# Patient Record
Sex: MALE | URBAN - METROPOLITAN AREA
[De-identification: names, ages, dates, MRNs, and addresses within clinical notes are randomized per-mention and may not be internally consistent; named-entity substitution may affect disease eponyms.]

---

## 2022-05-08 ENCOUNTER — HOME HEALTH ADMISSION (OUTPATIENT)
Dept: HOME HEALTH SERVICES | Facility: HOME HEALTH | Age: 77
End: 2022-05-08
Payer: MEDICARE

## 2022-05-09 ENCOUNTER — HOME CARE VISIT (OUTPATIENT)
Dept: SCHEDULING | Facility: HOME HEALTH | Age: 77
End: 2022-05-09
Payer: MEDICARE

## 2022-05-09 ENCOUNTER — HOME CARE VISIT (OUTPATIENT)
Dept: HOME HEALTH SERVICES | Facility: HOME HEALTH | Age: 77
End: 2022-05-09
Payer: MEDICARE

## 2022-05-09 VITALS
SYSTOLIC BLOOD PRESSURE: 128 MMHG | OXYGEN SATURATION: 92 % | TEMPERATURE: 97.5 F | HEART RATE: 83 BPM | DIASTOLIC BLOOD PRESSURE: 71 MMHG | RESPIRATION RATE: 18 BRPM

## 2022-05-09 VITALS
DIASTOLIC BLOOD PRESSURE: 71 MMHG | TEMPERATURE: 97.5 F | SYSTOLIC BLOOD PRESSURE: 128 MMHG | HEART RATE: 83 BPM | RESPIRATION RATE: 18 BRPM | OXYGEN SATURATION: 92 %

## 2022-05-09 PROCEDURE — G0151 HHCP-SERV OF PT,EA 15 MIN: HCPCS

## 2022-05-09 PROCEDURE — 400013 HH SOC

## 2022-05-09 PROCEDURE — MED12750

## 2022-05-09 PROCEDURE — A4245 ALCOHOL WIPES PER BOX: HCPCS

## 2022-05-09 PROCEDURE — G0299 HHS/HOSPICE OF RN EA 15 MIN: HCPCS

## 2022-05-09 PROCEDURE — A4216 STERILE WATER/SALINE, 10 ML: HCPCS

## 2022-05-09 NOTE — Clinical Note
1800 grooming 2  1810 upper body dressing 2  1820 Lower body dressing  2  1830 Bathing 5  1840 Toilet Transfer 1   1845 toilet hygiene 2  1850 Transferring 2  1860 Ambulation 3  1870 feeding/eating 1  1880 meals 2  1400 2   5

## 2022-05-09 NOTE — HOME HEALTH
S: patient reports that he wants to be able to walk outside - and cut the grass on his riding lawnmower   he also has the main goal of walking without the walker to get back to his prior level of function  O: PAIN: patient has a current flare up of gout in the right foot -pain rated 5/10 - highest is 9/10 and lowest is 5/10  patient is taking pain medication on schedule, educated patient on use of ice for pain and edema  patient to apply ice to the right foot  for pain and swelling control per MD protocol using a barrier to protect the skin. Patient to monitor the skin for any adverse effects such as color changes, irritation, mottled appearance. Patient to use ice for 20 minutes an hour for the first 2-3 days and then can reduce to 20 minutes 4-5 times a day. WOUND: patient has a drain from his chelsysectomy - nursing to monitor the wound site   ROM: B LE hip flexion, abduction and adduction WFL  B knee flexion, extension WFL   STRENGTH: R knee ext 5/5, R knee flexion 5/5, R hip flex 4/5, R hip abd/adduction 4+/5  L knee flexion and extension 5/5, L hip flexion 4/5 abduction and adduction 4+/5  BED MOBILITY: patient is independent with bed mobility . EQUIPMENT: FWW  TRANSFERS: patient was on the couch in arrival to the home - he has the R LE propped up due to pain and swelling from the gout in the right foot. He completed sit to stand from the couch with B UE push up and weight shift toward the left side due to R foot pain. He used the walker for initial standing balance and shifted the weight toward the left side in standing. He demonstrated ability to get into the bathroom and on/off the toilet with SBA  GAIT: ambulating with FWW in the home with antalgic gait pattern with reduced stance time on the right LE due to pain in the right foot.  He demonstrated continuous stepping pattern with asymmetrical step length (right greater than left) with cues for keeping the walker close to him as he tends to have a flexed trunk with the walker too far in front of him. He ambulated in the home for 25 feet x 2 with SBA and cues for gait correction  STEPS: patient has 3 steps to enter and egress the house via the front door - patient reports he does not have shoes that will fit on the right foot so we were unable to attempt the steps or uneven surfaces  BALANCE: radha 17/28 high fall risk   RESPONSE TO TREATMENT:patient demonstrated a positive outcome post treatment and reported a reduction in pain, increased comfort and increased confidence with transfers and mobility. Patient reported good understanding of the HEP and reports confidence in ability to complete the program as prescribed by PT  RESPONSE TO EDUCATION: patient was educated on: safety, HEP, plans for therapy - patient expressed understanding   Patient expressed understanding of all education provided and was able to repeat back education, precautions, and HEP. A:ASSESSMENT AND PROGRESS TOWARD GOALS:  Patient demonstrated a positive result to therapy this date as evidenced by an improvement in gait pattern with cues, decreased pain with exercise and walking and patient expressing an understanding of the rehab plan due to therapy verbal and written instructions. Goals established for increased independence in the home, safe mobility in the home, improvement in strength - all designed to reduce fall risk and progress toward independence. Patient will benefit from continued PT intervention to progress toward meeting all established goals    P:.continue with progression of mobility, ther ex for strength, ROM, provide education to patient and caregivers to maximize the recovery and reduce the fall risk to progress toward a return to independent function  plan for 3w1, 2w1    PMH/Summary of clinical condition: Acalculous Cholecystitis   List of Comorbidities:    GOUT    HTN    UTI     Medications review completed.     no changes reported  medications are effective at this time    social history/ caregivers:patient lives with his wife in a one story house with 3 steps to enter and egress via the front door. His wife is the main caregiver and assists as needed with meals, medications, ADLs, mobility andntransportation     Pt/Caregiver instructed on plan of care and are agreeable to plan of care at this time. Plan of care and admission to home health status called to attending Evi Ramirez MD     Discharge planning discussed with patient and caregiver. Discharge planning as follows: DC to self and family under MD supervision when all goals are met or maximum potential is reached  Pt/Caregiver did verbalize understanding. Patient education provided this visit: safety, pain control, mobility, plan for therapy     Home exercise program: stand/walk hourly with or without the walker with his wife   work on equal weight bearing during sit to stand and stand to sit    Continued need for the following skills: MD referral for HHPT following recent hospital stay. HHPT is medically necessary to address  dx and clinical findings: decreased ROM, decreased strength, impaired gait, decreased ability w stair negotiation, decreased transfer status, decreased endurance, decreased balance and decreased safety, increased pain in order to improve functional mobility/quality of life, decrease burden of care, reduce risk for re-hospitalization, work towards patient's personal goals of return to PLOF w decrease risk for falls.

## 2022-05-10 ENCOUNTER — HOME CARE VISIT (OUTPATIENT)
Dept: HOME HEALTH SERVICES | Facility: HOME HEALTH | Age: 77
End: 2022-05-10
Payer: MEDICARE

## 2022-05-11 ENCOUNTER — HOME CARE VISIT (OUTPATIENT)
Dept: SCHEDULING | Facility: HOME HEALTH | Age: 77
End: 2022-05-11
Payer: MEDICARE

## 2022-05-11 ENCOUNTER — HOME CARE VISIT (OUTPATIENT)
Dept: HOME HEALTH SERVICES | Facility: HOME HEALTH | Age: 77
End: 2022-05-11
Payer: MEDICARE

## 2022-05-11 VITALS
SYSTOLIC BLOOD PRESSURE: 150 MMHG | TEMPERATURE: 98 F | OXYGEN SATURATION: 96 % | RESPIRATION RATE: 20 BRPM | DIASTOLIC BLOOD PRESSURE: 85 MMHG | HEART RATE: 86 BPM

## 2022-05-11 PROCEDURE — G0157 HHC PT ASSISTANT EA 15: HCPCS

## 2022-05-11 NOTE — HOME HEALTH
Lian Blackman SUBJECTIVE: Patient reported that he no longer has gout pain. Pt did not want to walk outside this morning. CAREGIVER INVOLVEMENT/ASSISTANCE NEEDED FOR: Patient's wife assists with transportation, ADLS, and IADLS as needed. HOME HEALTH SUPPLIES BY TYPE AND QUANTITY ORDERED/DELIVERED THIS VISIT INCLUDE: None needed for this visit. OBJECTIVE:  See interventions. PATIENT RESPONSE TO TREATMENT: Patient had no c/o pain after walking and exervcising. PATIENT LEVEL OF UNDERSTANDING OF EDUCATION PROVIDED: Patient verbalized understanding on pain management techniques and fall prevention. ASSESSMENT OF PROGRESS TOWARD GOALS: Patient is progressing toward goals for gait and transfers. Patient previously needed SBA to ambulate safely but progressed to Supervision for gait training today. Patient ambulated with decreased chuck, decreased foot clearance, and forward flexed trunk. Gave multiple vc's for pt to lift B foot higher to clear floor safely and to maintain erect posture. Pt needs reinforcement for safety with gait and to improve posture. Pt also previously needed SBA to perform sit < > stand transfers but progressed to Supervision for transfer training today. Gave vc's for pt to lean forward and to push off using B UE to stand up. Pt needs reinforcement to improve transfer technique. In addition, instructed patient on performing therapeutic exercises for B LE/B hip strengthening and instructed pt to comply with HEP. CONTINUED NEED FOR THE FOLLOWING SKILLS: Gait Training, Stairs Training, Transfers Training, and Therapeutic Exercises. PLAN FOR NEXT VISIT: Patient will be seen 1w1 2w1 to increase safety with gait, to improve transfer technique, and to increase strength of B LE. DISCHARGE PLANNING DISCUSSED WITH PATIENT/CAREGIVER: Discharge patient to family with MD supervision when all goals are met. Pt/Caregiver did verbalize understanding of discharge planning.

## 2022-05-12 ENCOUNTER — HOME CARE VISIT (OUTPATIENT)
Dept: SCHEDULING | Facility: HOME HEALTH | Age: 77
End: 2022-05-12
Payer: MEDICARE

## 2022-05-12 VITALS
SYSTOLIC BLOOD PRESSURE: 146 MMHG | HEART RATE: 87 BPM | DIASTOLIC BLOOD PRESSURE: 88 MMHG | TEMPERATURE: 98.2 F | OXYGEN SATURATION: 96 %

## 2022-05-12 VITALS
OXYGEN SATURATION: 96 % | RESPIRATION RATE: 18 BRPM | SYSTOLIC BLOOD PRESSURE: 128 MMHG | DIASTOLIC BLOOD PRESSURE: 65 MMHG | TEMPERATURE: 97.7 F | HEART RATE: 76 BPM

## 2022-05-12 VITALS
OXYGEN SATURATION: 93 % | SYSTOLIC BLOOD PRESSURE: 147 MMHG | HEART RATE: 78 BPM | TEMPERATURE: 98.4 F | DIASTOLIC BLOOD PRESSURE: 68 MMHG | RESPIRATION RATE: 16 BRPM

## 2022-05-12 PROCEDURE — G0157 HHC PT ASSISTANT EA 15: HCPCS

## 2022-05-12 PROCEDURE — G0152 HHCP-SERV OF OT,EA 15 MIN: HCPCS

## 2022-05-12 PROCEDURE — G0299 HHS/HOSPICE OF RN EA 15 MIN: HCPCS

## 2022-05-12 NOTE — HOME HEALTH
SUBJECTIVE: Patient c/o elevated pain #6/10 from his right foot this afternoon due to gout. He did not want to do anything today. CAREGIVER INVOLVEMENT/ASSISTANCE NEEDED FOR: Patient's wife assists with transportation, ADLS, and IADLS as needed. HOME HEALTH SUPPLIES BY TYPE AND QUANTITY ORDERED/DELIVERED THIS VISIT INCLUDE: None needed for this visit. OBJECTIVE:  See interventions. PATIENT RESPONSE TO TREATMENT: Defer to next encounter. PATIENT LEVEL OF UNDERSTANDING OF EDUCATION PROVIDED: Patient verbalized understanding on pain management techniques and fall prevention. ASSESSMENT OF PROGRESS TOWARD GOALS: Patient was unable to progress toward goals for physical therapy. Pt previously ambulated and performed transfers training yesterday but refused to getting up to walk or perform therapeutic exercises due to c/o increased R foot pain due to gout. Focused today's visit on patient education regarding pain management, fall prevention, and therapeutic exercises. Plan to walk outside next visit. Discussed with patient/CG plan to discharge pt from [facility] PT services next week. Pt/CG verbalized understanding and is in agreement with discharge plan. CONTINUED NEED FOR THE FOLLOWING SKILLS: Gait Training, Stairs Training, Transfers Training, and Therapeutic Exercises. PLAN FOR NEXT VISIT: Patient will be seen 2w1 to increase s afety with gait, to improve transfer technique, and to increase strength of B LE. DISCHARGE PLANNING DISCUSSED WITH PATIENT/CAREGIVER: Discharge patient to family with MD supervision when all goals are met. Pt/Caregiver did verbalize understanding of discharge planning.

## 2022-05-12 NOTE — Clinical Note
Therapy Functional Score Assessment  Question   Score   Grooming  1       Upper Dressing 1      Lower Dressing 1      Bathing  5      Toilet Transfer  1    Transfer  1            Ambulation  3   Dyspnea                     1       Pain Interfering with activity 4  Est number therapy visits      1  Pt declined further OT visits

## 2022-05-12 NOTE — HOME HEALTH
Caregiver involvement: Dandre Meadows (spouse) lives with pt and provides daily emotional support and S for safety with ADL and mobility. Medications reviewed and all medications are available in the home this visit. The following education was provided regarding medications, medication interactions, and look alike medications (specify): Continue as directed by MD.    Medications  are effective at this time. Patient education provided this visit: see ADL note. Sharps education provided:  na    Patient level of understanding of education provided: see ADL note    Skilled Care Performed this visit: Completed OT evaluation and assessment for safety with ADL and mobility. Patient response to procedure performed:  Mr. Dyan Reddy had a good response to therapy. He deneis having increased pain during the assessment and was agreeable to particiapte and demonstrate ability complete ADL tasks. Patient's Progress towards personal goals: Mr. Dyan Reddy has good potential to return to independence with ADL and mobility through continued S from family for safety with ADL and mobility. Home exercise program: na    Continued need for the following skills: Nursing and Physical Therapy    Discharge Plans:  1w1 with plans to discharge to self per pt request.  Will notify MD of OT discharge.     PMH/Summary of clinical condition: Acalculous Cholecystitis (NECROINFLAMMATORY DISEASE OF THE GALLBLADDER)  List of Comorbidities:   GOUT   HTN   UTI

## 2022-05-16 ENCOUNTER — HOME CARE VISIT (OUTPATIENT)
Dept: HOME HEALTH SERVICES | Facility: HOME HEALTH | Age: 77
End: 2022-05-16
Payer: MEDICARE

## 2022-05-16 ENCOUNTER — HOME CARE VISIT (OUTPATIENT)
Dept: SCHEDULING | Facility: HOME HEALTH | Age: 77
End: 2022-05-16
Payer: MEDICARE

## 2022-05-16 VITALS
HEART RATE: 74 BPM | SYSTOLIC BLOOD PRESSURE: 138 MMHG | OXYGEN SATURATION: 95 % | TEMPERATURE: 98.2 F | RESPIRATION RATE: 15 BRPM | DIASTOLIC BLOOD PRESSURE: 88 MMHG

## 2022-05-16 VITALS
RESPIRATION RATE: 16 BRPM | DIASTOLIC BLOOD PRESSURE: 83 MMHG | SYSTOLIC BLOOD PRESSURE: 134 MMHG | HEART RATE: 71 BPM | TEMPERATURE: 97.9 F | OXYGEN SATURATION: 97 %

## 2022-05-16 PROCEDURE — G0157 HHC PT ASSISTANT EA 15: HCPCS

## 2022-05-16 PROCEDURE — G0299 HHS/HOSPICE OF RN EA 15 MIN: HCPCS

## 2022-05-16 NOTE — HOME HEALTH
SUBJECTIVE: Patient had no c/o pain from his R foot this afternoon. CAREGIVER INVOLVEMENT/ASSISTANCE NEEDED FOR: Patient's wife assists with transportation, ADLS, and IADLS as needed. HOME HEALTH SUPPLIES BY TYPE AND QUANTITY ORDERED/DELIVERED THIS VISIT INCLUDE: None needed for this visit. OBJECTIVE:  See interventions. PATIENT RESPONSE TO TREATMENT: Patient was short of breath but recovered after sitting for 1 minute. Patient did not report any pain this visit. PATIENT LEVEL OF UNDERSTANDING OF EDUCATION PROVIDED: Patient repeated back teaching on pain management techniques and fall prevention. ASSESSMENT OF PROGRESS TOWARD GOALS: Patient progressed toward goals for gait and stairs this visit. Pt continues to need Supervision to ambulate safely but progressed to ambulating longer distance outside over uneven surfaces. Patient ambulated with good step length and improved foot clearance but with forward flexed posture. Gave multiple vc's for pt to maintain erect posture. Pt needs constant vc's to improve posture. Pt also previously did not go up and down stairs due to gout pain from R foot but progressed to SBA for stairs training today. Pt led with R LE to go up stairs and led with his L LE to go down stairs. Pt needed assistance to bring FWW up and down stairs. In addition, Patient demonstrated improved balance as evidence by improving his Tinetti balance assessment score from 17/28 from PT initial evaluation to 20/28 today. Discussed with patient/CG plan to discharge pt from [facility] PT services this week. Pt/CG verbalized understanding and is in agreement with discharge plan. CONTINUED NEED FOR THE FOLLOWING SKILLS: None. PLAN FOR NEXT VISIT: Patient will be seen 1w1 to reassess safety with gait, to improve transfer technique, and to increase strength of B LE. DISCHARGE PLANNING DISCUSSED WITH PATIENT/CAREGIVER: Discharge patient to family with MD supervision when all goals are met.  Pt/Caregiver did verbalize understanding of discharge planning.

## 2022-05-16 NOTE — HOME HEALTH
Skilled reason for visit: ACALCULOUS CHOLECYSTITIS, HTN, PRESSURE ULCER DRAIN CARE, AND MEDICATION TEACHING. Caregiver involvement: SPOUSE PERFORM DRAIN FLUSHING/ADLS/TRANSPORTATION    Medications reviewed and all medications are available in the home this visit. The following education was provided regarding medications:  LASIX, NORVASC, allopurinol. REVIEWED REASON, FREQUENCY, AND SIDE EFFECTS. LASIX:Reviewed daily weight with similar clothing after urination in the AM. Record results daily reporting any weight gain of 3-5lbs in a day to MD.     MD notified of any discrepancies/look a-like medications/medication interactions:NORCO NOT TAKING BUT REVIEWED: oxycodone reviewed to take with food, constipation, fall and sedation risk. Medications are effective at this time. Home health supplies by type and quantity ordered/delivered this visit include: drain flushes. Patient education provided this visit: see interventions tab. Sharps education provided: N/A    Patient level of understanding of education provided: see interventions tab. Skilled Care Performed this visit: Called Dr Shmuel David office to reported erythema around the drain insertion site. no pain, fever, chills, warmth or drainage noted. Spoke to Jayant who transferred to Dr Shmuel David nurse. LM on nursing VM. waiting on return call. instructed that if s/sx of infection: fever, chills, drainage, pain or erythema spreads to seek medical treatment. Patient response to procedure performed: tolerated drain assessment/care without any complaints of pain.     Agency Progress toward goals: see intervention tab to progression toward goals         Patient's Progress towards personal goals: see intervention tab to progression toward goals         Home exercise program: CONT ALL MEDICATIONS AS PRESCRIBED, DIET AS PRESCRIBED, IMPLEMENT FALL/INFECTION CONTROL/PRESSURE ULCER INTERVENTIONS,MONITOR FOR ABNORMAL S/SX infection/HTN (listed in intervention tab) Marquise Ponce TO MD IMMEDIATELY, AND PERFORM DAILY WEIGHTS. KEEP ALL FOLLOW UP APPTS AS PRESCRIBED. READ ALL TEACHING PACKETS FOR EDUCATION OF DISEASE PROCESS & TO PREVENT COMPLICATIONS. Continued need for the following skills: Nursing, Physical Therapy and Occupational Therapy    Plan for next visit: drain care, ACALCULOUS CHOLECYSTITIS, and medication teaching. Patient and/or caregiver notified and agrees to changes in the Plan of Care N/A      The following discharge planning was discussed with the pt/caregiver:  Will discharge when all  ACALCULOUS CHOLECYSTITIS disease process, complication and dietary goals are met and understands all medication purpose/frequencies/side effects

## 2022-05-17 ENCOUNTER — HOME CARE VISIT (OUTPATIENT)
Dept: SCHEDULING | Facility: HOME HEALTH | Age: 77
End: 2022-05-17
Payer: MEDICARE

## 2022-05-17 PROCEDURE — G0156 HHCP-SVS OF AIDE,EA 15 MIN: HCPCS

## 2022-05-20 ENCOUNTER — HOME CARE VISIT (OUTPATIENT)
Dept: SCHEDULING | Facility: HOME HEALTH | Age: 77
End: 2022-05-20
Payer: MEDICARE

## 2022-05-20 VITALS
DIASTOLIC BLOOD PRESSURE: 74 MMHG | RESPIRATION RATE: 16 BRPM | SYSTOLIC BLOOD PRESSURE: 120 MMHG | OXYGEN SATURATION: 94 % | HEART RATE: 76 BPM | TEMPERATURE: 98.4 F

## 2022-05-20 PROCEDURE — G0151 HHCP-SERV OF PT,EA 15 MIN: HCPCS

## 2022-05-20 NOTE — Clinical Note
Patient was seen for home care physical therapy following a hospitalization for acalculous cholecystitis. He was also experiencing a gout flare up in the right foot when PT evaluation him in his home. He was using a walker due to pain in the right foot and his main goal was to walk without the walker and return to prior level of function. He has progressed well in all areas - the pain in his right foot has gone away at this time. He has developed increased pain with a possible infecton in the drain sit and he is going to the MD today to get tests done about this issue. He is independent with bed mobility and transfers in and out of the home. He is walking without an assistive device on even and uneven surface with independence. His gait is characterized by a wide base of support with symmetrical stepping and good bilateral foot clearance. He demonstrates good stability on incline surfaces of the driveway and with changes in surface/direction. He is modified independent with the steps to egress and enter the house via the front door. He has been educated on a HEP and is ready for DC from home care PT at this time.

## 2022-05-20 NOTE — HOME HEALTH
S: patient reports that the right foot pain from the gout flare up is gone and he reports he has not been using an assistive device    O: PAIN: no pain in the right foot    WOUND: patient has a drain from his chelsysectomy - nursing to monitor the wound site     ROM: B LE hip flexion, abduction and adduction WFL  B knee flexion, extension WFL     STRENGTH: R knee ext 5/5, R knee flexion 5/5, R hip flex 4/5, R hip abd/adduction 5/5  L knee flexion and extension 5/5, L hip flexion 5/5 abduction and adduction 5/5    BED MOBILITY: patient is independent with bed mobility . EQUIPMENT: FWW    TRANSFERS: independent with transfers in and out of the home to and from the bed, toilet, shower, chair and car    GAIT/STEPS: He is walking without an assistive device on even and uneven surface with independence. His gait is characterized by a wide base of support with symmetrical stepping and good bilateral foot clearance. He demonstrates good stability on incline surfaces of the driveway and with changes in surface/direction. He is modified independent with the steps to egress and enter the house via the front door. BALANCE: tinetti 17/28 high fall risk   tinetti 25/28 low fall risk   greater than a 4 point increase in the tinetti score indicates a statistically significant reduction in fall risk        RESPONSE TO TREATMENT:patient demonstrated a positive outcome post treatment and reported no increase in abdominal pain, increased comfort and increased confidence with transfers and mobility. Patient reported good understanding of the HEP and reports confidence in ability to complete the program as prescribed by PT    RESPONSE TO EDUCATION: patient was educated on: safety, HEP, plans for therapy - patient expressed understanding     Patient expressed understanding of all education provided and was able to repeat back education, precautions, and HEP.     A:ASSESSMENT AND PROGRESS TOWARD GOALS: Patient was seen for home care physical therapy following a hospitalization for acalculous cholecystitis. He was also experiencing a gout flare up in the right foot when PT evaluation him in his home. He was using a walker due to pain in the right foot and his main goal was to walk without the walker and return to prior level of function. He has progressed well in all areas - the pain in his right foot has gone away at this time. He has developed increased pain with a possible infecton in the drain sit and he is going to the MD today to get tests done about this issue. He is independent with bed mobility and transfers in and out of the home. He is walking without an assistive device on even and uneven surface with independence. His gait is characterized by a wide base of support with symmetrical stepping and good bilateral foot clearance. He demonstrates good stability on incline surfaces of the driveway and with changes in surface/direction. He is modified independent with the steps to egress and enter the house via the front door. He has been educated on a HEP and is ready for DC from home care PT at this time. P: DC PT     PMH/Summary of clinical condition: Acalculous Cholecystitis   List of Comorbidities:    GOUT    HTN    UTI     Medications review completed. no changes reported  medications are effective at this time    social history/ caregivers:patient lives with his wife in a one story house with 3 steps to enter and egress via the front door. His wife is the main caregiver and assists as needed with meals, medications, ADLs and transportation     Patient education provided this visit:HEP, signs of infection - he expressed understanding    Home exercise program:1. Walking every hour during the daytime for 3-5 minutes. 2. Sitting: APs, LAQ, hip flexion, hip abd, and hip add x 10 reps each 3x/day. Written instructions issued. Patient/caregiver verbalized understanding.   3. continue to progress walking with increasing time and distance as tolerated

## 2022-05-24 ENCOUNTER — HOME CARE VISIT (OUTPATIENT)
Dept: HOME HEALTH SERVICES | Facility: HOME HEALTH | Age: 77
End: 2022-05-24
Payer: MEDICARE

## 2022-05-30 ENCOUNTER — HOME CARE VISIT (OUTPATIENT)
Dept: SCHEDULING | Facility: HOME HEALTH | Age: 77
End: 2022-05-30
Payer: MEDICARE

## 2022-05-30 VITALS
TEMPERATURE: 97.6 F | RESPIRATION RATE: 18 BRPM | OXYGEN SATURATION: 94 % | HEART RATE: 80 BPM | DIASTOLIC BLOOD PRESSURE: 73 MMHG | SYSTOLIC BLOOD PRESSURE: 136 MMHG

## 2022-05-30 PROCEDURE — G0299 HHS/HOSPICE OF RN EA 15 MIN: HCPCS

## 2022-06-02 ENCOUNTER — HOME CARE VISIT (OUTPATIENT)
Dept: SCHEDULING | Facility: HOME HEALTH | Age: 77
End: 2022-06-02
Payer: MEDICARE

## 2022-06-02 VITALS
TEMPERATURE: 97.5 F | RESPIRATION RATE: 18 BRPM | DIASTOLIC BLOOD PRESSURE: 60 MMHG | OXYGEN SATURATION: 95 % | HEART RATE: 78 BPM | SYSTOLIC BLOOD PRESSURE: 120 MMHG

## 2022-06-02 PROCEDURE — G0299 HHS/HOSPICE OF RN EA 15 MIN: HCPCS

## 2022-06-07 ENCOUNTER — HOME CARE VISIT (OUTPATIENT)
Dept: SCHEDULING | Facility: HOME HEALTH | Age: 77
End: 2022-06-07
Payer: MEDICARE

## 2022-06-07 PROCEDURE — G0156 HHCP-SVS OF AIDE,EA 15 MIN: HCPCS

## 2022-06-08 ENCOUNTER — HOME CARE VISIT (OUTPATIENT)
Dept: SCHEDULING | Facility: HOME HEALTH | Age: 77
End: 2022-06-08
Payer: MEDICARE

## 2022-06-08 PROCEDURE — 400013 HH SOC

## 2022-06-08 NOTE — HOME HEALTH
Called pt several times with issues with his phone asked scheduling to see if someone else can get in touch with him . MD Notified and RN otified both Via calls.

## 2022-06-09 ENCOUNTER — HOME CARE VISIT (OUTPATIENT)
Dept: HOME HEALTH SERVICES | Facility: HOME HEALTH | Age: 77
End: 2022-06-09
Payer: MEDICARE

## 2022-06-09 VITALS
OXYGEN SATURATION: 97 % | HEART RATE: 87 BPM | DIASTOLIC BLOOD PRESSURE: 66 MMHG | SYSTOLIC BLOOD PRESSURE: 120 MMHG | RESPIRATION RATE: 20 BRPM | TEMPERATURE: 97.5 F

## 2022-06-09 VITALS
DIASTOLIC BLOOD PRESSURE: 70 MMHG | OXYGEN SATURATION: 98 % | SYSTOLIC BLOOD PRESSURE: 120 MMHG | HEART RATE: 78 BPM | TEMPERATURE: 97.6 F | RESPIRATION RATE: 20 BRPM

## 2022-06-09 PROCEDURE — 400013 HH SOC

## 2022-06-09 PROCEDURE — G0300 HHS/HOSPICE OF LPN EA 15 MIN: HCPCS

## 2022-06-09 NOTE — HOME HEALTH
Skilled reason for visit: Education and drain care     Caregiver involvement: Pt independent with care      Medications reviewed and all medications are available in the home this visit. The following education was provided regarding medications:  ZEYNEP WOOTEN notified of any discrepancies/look a-like medications/medication interactions: ZEYNEP  Medications are effective  at this time. Home health supplies by type and quantity ordered/delivered this visit include: Supplies avalable    Patient education provided this visit: Increase protein n diet and monitor for signs and symptoms of infection to include increase drainage, fever over 101.1  Reviewed to increase fiber/fruits/vegetables in diet, hydration,ambulation and OTC stool softner to improve constipation. verbalized understanding. Reviewed daily weight with similar clothing after urination in the AM. Record results daily reporting any weight gain of 3-5lbs in a day to MD.  Edcucated pt to ambulate 1x an hour to help build strength and endurance to get stronger.      Sharps education provided: ZEYNEP    Patient level of understanding of education provided: Mercedez Rene all understanding to above education        Skilled Care Performed this visit: Education     Patient response to procedure performed:  No pain verbalzied during flushing of drain    Agency Progress toward goals: Progressing toward interventions above      Patient's Progress towards personal goals: Progressing toward interventions above      Home exercise program: pt conitnues to take  medication as ordered and follows up on all  md appintments  and to abmulate using walker around the house     Continued need for the following skills: Nursing    Plan for next visit: Educatin, drain care and ambulating     Patient and/or caregiver notified and agrees to changes in the Plan of Care yes    The following discharge planning was discussed with the pt/caregiver: when patient reaches goals and medication is managed, and disease processes are understood patient agrees and understand that discharge will take place.

## 2022-06-09 NOTE — HOME HEALTH
Skilled reason for visit: Drain site care and education    Caregiver involvement: Pt independent with care      Medications reviewed and all medications are available in the home this visit. The following education was provided regarding medications:  ZEYNEP WOOTEN notified of any discrepancies/look a-like medications/medication interactions: ZEYNEP  Medications are effective at this time. Home health supplies by type and quantity ordered/delivered this visit include: Supplies available     Patient education provided this visit: Increase protein n diet and monitor for signs and symptoms of infection to include increase drainage, fever over 101.1  Educated pt to hydrate and ambulate every hour to help gain strength and endurance. Walked with pt outside and his walker. Sharps education provided: ZEYNEP    Patient level of understanding of education provided: Janny  all understanding to above education      Skilled Care Performed this visit: Drain care and education     Patient response to procedure performed:  No pain verbalzied during flush and bandage change     Agency Progress toward goals: Progressing toward interventions above      Patient's Progress towards personal goals: Progressing toward interventions above      Home exercise program: pt conitnues to take  medication as ordered and follows up on all  md appintments    Continued need for the following skills: Nursing     Plan for next visit: Drain care and site care     Patient and/or caregiver notified and agrees to changes in the Plan of Care yes    The following discharge planning was discussed with the pt/caregiver: when patient reaches goals and medication is managed, and disease processes are understood patient agrees and understand that discharge will take place.

## 2022-06-16 ENCOUNTER — HOME CARE VISIT (OUTPATIENT)
Dept: SCHEDULING | Facility: HOME HEALTH | Age: 77
End: 2022-06-16
Payer: MEDICARE

## 2022-06-16 VITALS — RESPIRATION RATE: 19 BRPM | HEART RATE: 76 BPM | OXYGEN SATURATION: 98 % | TEMPERATURE: 97.6 F

## 2022-06-16 PROCEDURE — G0300 HHS/HOSPICE OF LPN EA 15 MIN: HCPCS

## 2022-06-16 NOTE — HOME HEALTH
Skilled reason for visit: Drain treatment and education    Caregiver involvement: Pt independent with care  pt has family to help if needed    Medications reviewed and all medications are available in the home this visit. The following education was provided regarding medications:  ZEYNEP WOOTEN notified of any discrepancies/look a-like medications/medication interactions: ZEYNEP  Medications are effective at this time. Home health supplies by type and quantity ordered/delivered this visit include: ZEYNEP    Patient education provided this visit: Educated to move and hydrate to gain strength. PTs drain is draining with no issues and flushed with no problems. Educted pt to increase his protein     Sharps education provided: ZEYNEP    Patient level of understanding of education provided: Laquita Flores all understanding to above education      Skilled Care Performed this visit: Eucation and drain care    Patient response to procedure performed:  ZEYNEP    Agency Progress toward goals: Progressing toward interventions above      Patient's Progress towards personal goals: Progressing toward interventions above      Home exercise program: pt conitnues to take  medication as ordered and follows up on all  md appintments  Continued need for the following skills: Nursing     Plan for next visit: Drain care and education    Patient and/or caregiver notified and agrees to changes in the Plan of Care yes    The following discharge planning was discussed with the pt/caregiver: when patient reaches goals and medication is managed, and disease processes are understood patient agrees and understand that discharge will take place.

## 2022-06-17 ENCOUNTER — HOME CARE VISIT (OUTPATIENT)
Dept: HOME HEALTH SERVICES | Facility: HOME HEALTH | Age: 77
End: 2022-06-17
Payer: MEDICARE

## 2022-06-23 ENCOUNTER — HOME CARE VISIT (OUTPATIENT)
Dept: SCHEDULING | Facility: HOME HEALTH | Age: 77
End: 2022-06-23
Payer: MEDICARE

## 2022-06-23 PROCEDURE — G0300 HHS/HOSPICE OF LPN EA 15 MIN: HCPCS

## 2022-06-23 NOTE — HOME HEALTH
Skilled reason for visit: Education and drain care     Caregiver involvement: Pt independent with care      Medications reviewed and all medications ARE available in the home this visit. The following education was provided regarding medications:  ZEYNEP WOOTEN notified of any discrepancies/look a-like medications/medication interactions: ZEYNEP  Medications are effective at this time. Home health supplies by type and quantity ordered/delivered this visit include: ZEYNEP    Patient education provided this visit:  Reviewed to reported any redness, swelling, warmth, fever, chills, increased heart/respiratory rate, uncontrolled pain/tenderness, drainage:green/yellow/brown, or odor to MD immediately. Reviewed fall precautions and safety:dangle, uses assistive device at all times, non skid foot wear,and night light. Verbalized understanding. Aaliyahs education provided: ZEYNEP    Patient level of understanding of education provided: Zabrina Soler all understanding to above education    Skilled Care Performed this visit: Education and drain flushing    Patient response to procedure performed:  ZEYNEP    Agency Progress toward goal: Progressing toward interventions above      Patient's Progress towards personal goals: Progressing toward interventions above      Home exercise program: pt conitnues to take  medication as ordered and follows up on all  md appintments    Continued need for the following skills: Nursing    Plan for next visit: Education     Patient and/or caregiver notified and agrees to changes in the Plan of Care yes    The following discharge planning was discussed with the pt/caregiver: when patient reaches goals and medication is managed, and disease processes are understood patient agrees and understand that discharge will take place.

## 2022-06-30 ENCOUNTER — HOME CARE VISIT (OUTPATIENT)
Dept: HOME HEALTH SERVICES | Facility: HOME HEALTH | Age: 77
End: 2022-06-30
Payer: MEDICARE

## 2022-06-30 NOTE — HOME HEALTH
No nursing visit needed pt to be discharged 7/5/22 and will have Xray 7/7/22 to remove drain.  MD bess and RN notified

## 2022-07-05 ENCOUNTER — HOME CARE VISIT (OUTPATIENT)
Dept: SCHEDULING | Facility: HOME HEALTH | Age: 77
End: 2022-07-05
Payer: MEDICARE

## 2022-07-05 VITALS
SYSTOLIC BLOOD PRESSURE: 129 MMHG | HEART RATE: 88 BPM | TEMPERATURE: 97.6 F | OXYGEN SATURATION: 96 % | RESPIRATION RATE: 18 BRPM | DIASTOLIC BLOOD PRESSURE: 63 MMHG

## 2022-07-05 PROCEDURE — G0299 HHS/HOSPICE OF RN EA 15 MIN: HCPCS

## 2022-07-07 NOTE — HOME HEALTH
Discharge note  Services provided: Nursing  Progress towards goal: met  Mental/emotional status: alert and oriented x 4, stable  Safety precautions implemented: fall and universal  Activity level: Up as tolerated, use walker  Was patient discharged at higher level of functioning: yes  HME in home: walker  Supplies in home: n/a  Discharge instructions provided to patient  Medication instructions: as prescribed by dr. Gina Anderson instructions: regular diet  Patient/caregiver notifed of how to obtain supplies, medications  Response: verbalized understanding  Patient agrees with discharge. Over the last 60 days patient and caregiver have been provided education on gallbladder drain management, flushing 4x day instruction, gout management, med management and pain management. Patient has improved overall and is being discharged 7/5/22 with all goals met. Patient has appointment 7/7/22 to have his drain removed. No further needs at this time. Tl Burnette, NP office notified of discharge. Patient provided discharge instructions to include: call dr if any s/s of infection, temp >100.4, SOB, chest discomfort, new pain not relieved by medications, any questions or concerns.